# Patient Record
Sex: FEMALE | Race: BLACK OR AFRICAN AMERICAN | NOT HISPANIC OR LATINO | URBAN - NONMETROPOLITAN AREA
[De-identification: names, ages, dates, MRNs, and addresses within clinical notes are randomized per-mention and may not be internally consistent; named-entity substitution may affect disease eponyms.]

---

## 2020-07-13 ENCOUNTER — IMPORTED ENCOUNTER (OUTPATIENT)
Dept: URBAN - NONMETROPOLITAN AREA CLINIC 1 | Facility: CLINIC | Age: 45
End: 2020-07-13

## 2020-07-13 PROBLEM — H52.4: Noted: 2020-07-13

## 2020-07-13 PROBLEM — H40.013: Noted: 2020-07-13

## 2020-07-13 PROCEDURE — 92004 COMPRE OPH EXAM NEW PT 1/>: CPT

## 2020-07-13 PROCEDURE — 92015 DETERMINE REFRACTIVE STATE: CPT

## 2020-07-13 NOTE — PATIENT DISCUSSION
Presbyopia OUDiscussed refractive status in detail with patient. New glasses Rx given today. Continue to monitor. Glaucoma Suspect OUDiscussed diagnosis in detail with patient. No family history of disease. IOP at 09 OD and 08 OS. Cup to disc noted at 0.8 OU. Continue to monitor. RTC in 6 months with VF 24-2 OCT and Pach

## 2021-07-16 ENCOUNTER — IMPORTED ENCOUNTER (OUTPATIENT)
Dept: URBAN - NONMETROPOLITAN AREA CLINIC 1 | Facility: CLINIC | Age: 46
End: 2021-07-16

## 2021-07-16 PROBLEM — H40.013: Noted: 2021-07-16

## 2021-07-16 PROCEDURE — 99214 OFFICE O/P EST MOD 30 MIN: CPT

## 2021-07-16 PROCEDURE — 92133 CPTRZD OPH DX IMG PST SGM ON: CPT

## 2021-07-16 PROCEDURE — 92083 EXTENDED VISUAL FIELD XM: CPT

## 2021-07-16 PROCEDURE — 76514 ECHO EXAM OF EYE THICKNESS: CPT

## 2021-07-16 NOTE — PATIENT DISCUSSION
Glaucoma Suspect OUDiscussed diagnosis in detail with patient. No family history of disease. VF done today; full no defects OU. OCT done today; no NFL thinning OU. Pach done today; thick corneas 594 OD and 588 OS. IOP at 12 OU. Cup to disc noted at 0.8 OU. Continue to monitor.; 's Notes: MR  7/13/20DFE  7/13/20VF  7/16/21OCT  7/16/21OPTOSGONIOPACH  7/16/21

## 2022-04-09 ASSESSMENT — KERATOMETRY
OS_AXISANGLE_DEGREES: 164
OD_K1POWER_DIOPTERS: 42.25
OS_K1POWER_DIOPTERS: 42.50
OD_K2POWER_DIOPTERS: 43.25
OS_K2POWER_DIOPTERS: 43.25
OD_AXISANGLE_DEGREES: 006

## 2022-04-09 ASSESSMENT — VISUAL ACUITY
OD_SC: 20/20
OS_CC: 20/29+
OS_SC: 20/20
OD_CC: 20/32+

## 2022-04-09 ASSESSMENT — TONOMETRY
OD_IOP_MMHG: 12
OS_IOP_MMHG: 08
OD_IOP_MMHG: 09
OS_IOP_MMHG: 12

## 2022-07-29 ENCOUNTER — COMPREHENSIVE EXAM (OUTPATIENT)
Dept: URBAN - NONMETROPOLITAN AREA CLINIC 1 | Facility: CLINIC | Age: 47
End: 2022-07-29

## 2022-07-29 DIAGNOSIS — H52.4: ICD-10-CM

## 2022-07-29 PROCEDURE — 92014 COMPRE OPH EXAM EST PT 1/>: CPT

## 2022-07-29 PROCEDURE — 92015 DETERMINE REFRACTIVE STATE: CPT

## 2022-07-29 ASSESSMENT — TONOMETRY
OS_IOP_MMHG: 14
OD_IOP_MMHG: 15

## 2022-07-29 ASSESSMENT — VISUAL ACUITY
OD_SC: 20/32-
OS_SC: 20/29